# Patient Record
(demographics unavailable — no encounter records)

---

## 2025-06-25 NOTE — HISTORY OF PRESENT ILLNESS
[FreeTextEntry1] : 56M  Jerrica Aggarwal (39F) negative female factor pt with 2 kids aged 22 and 20 years old no previous difficulty with conceptoin SA: 1.5 ml/2 nonmotile sperm no ED no EjD loss of nocturnal erections no recent systemic illness

## 2025-06-25 NOTE — PHYSICAL EXAM
[Urethral Meatus] : meatus normal [Penis Abnormality] : normal circumcised penis [Urinary Bladder Findings] : the bladder was normal on palpation [Scrotum] : the scrotum was normal [Epididymis] : the epididymides were normal [Testes Tenderness] : no tenderness of the testes [Testes Mass (___cm)] : there were no testicular masses [de-identified] : grade II left varicocele. 18cc firm testes x 2.

## 2025-06-25 NOTE — ASSESSMENT
[FreeTextEntry1] : hypogoandism left varicocele cryptozoopsermia TT FSH LH E2 scrotal ultrasound  repeat SA with cryo f/u 2 weeks

## 2025-06-25 NOTE — LETTER BODY
[Dear  ___] : Dear  [unfilled], [FreeTextEntry2] : Lorin Ibarra MD [facility] [address] [place] [address]  [FreeTextEntry1] : I had the pleasure of seeing Adan Frederick, a 56 year old man,  to your patient Jerrica Aggarwal, in the office in consultation today. Please see the attached note for full details.  Thank you very much for allowing me to participate in the care of this patient. If you have any questions please feel free to call me at any time.    Sincerely yours,    Adam Woo MD, DAVID Director, Male Fertility and Microsurgery  of Urology [facility]

## 2025-07-08 NOTE — HISTORY OF PRESENT ILLNESS
[FreeTextEntry1] :     56M  Jerrica Aggarwal (39F) negative female factor pt with 2 kids aged 22 and 20 years old no previous difficulty with conceptoin SA: 1.5 ml/2 nonmotile sperm no ED no EjD loss of nocturnal erections no recent systemic illness  7/8/2025 E2 34 FSH 9.8 LH 10 / FT12.6  scrotal sono - 3.4mm left varicocele TRUS today - no clear EjD obsturciton

## 2025-07-08 NOTE — ASSESSMENT
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 Risks- bleeding, pain, infection, hypogonadism all discussed.  Risk of secondary epididymal obstruction also discussed. Procedure will be done under local anesthesia. He understands that sperm yields tend to be lower with this and that he may require future procedure for repeat sperm extraction if IVF attempts fail. understands low possibility that this acutally represensts a production failure will schedule TESE by phone if no sperm on next SA and cryopreserve at [place]

## 2025-07-08 NOTE — LETTER BODY
[Dear  ___] : Dear  [unfilled], [FreeTextEntry2] : Lorin Ibarra MD [facility] [address] [facility] [place] [address] [FreeTextEntry1] : I had the pleasure of seeing Adan Frederick, a 56 year old man,  to your patient Jerrica Aggarwal, in the office in consultation today. Please see the attached note for full details.  Thank you very much for allowing me to participate in the care of this patient. If you have any questions please feel free to call me at any time.   Sincerely yours,    Adam Woo MD, DAVID Director, Male Fertility and Microsurgery  of Urology [facility].